# Patient Record
Sex: MALE | Race: WHITE | HISPANIC OR LATINO | URBAN - METROPOLITAN AREA
[De-identification: names, ages, dates, MRNs, and addresses within clinical notes are randomized per-mention and may not be internally consistent; named-entity substitution may affect disease eponyms.]

---

## 2020-03-14 ENCOUNTER — EMERGENCY (EMERGENCY)
Age: 60
LOS: 1 days | Discharge: HOME | End: 2020-03-14
Attending: EMERGENCY MEDICINE | Admitting: EMERGENCY MEDICINE
Payer: COMMERCIAL

## 2020-03-14 VITALS
HEART RATE: 88 BPM | OXYGEN SATURATION: 100 % | SYSTOLIC BLOOD PRESSURE: 162 MMHG | DIASTOLIC BLOOD PRESSURE: 87 MMHG | RESPIRATION RATE: 18 BRPM

## 2020-03-14 VITALS
RESPIRATION RATE: 18 BRPM | TEMPERATURE: 99 F | OXYGEN SATURATION: 100 % | DIASTOLIC BLOOD PRESSURE: 84 MMHG | SYSTOLIC BLOOD PRESSURE: 144 MMHG | HEART RATE: 90 BPM

## 2020-03-14 PROCEDURE — 71046 X-RAY EXAM CHEST 2 VIEWS: CPT | Mod: 26

## 2020-03-14 PROCEDURE — 99284 EMERGENCY DEPT VISIT MOD MDM: CPT

## 2020-03-14 RX ORDER — LIDOCAINE 4 G/100G
1 CREAM TOPICAL ONCE
Refills: 0 | Status: COMPLETED | OUTPATIENT
Start: 2020-03-14 | End: 2020-03-14

## 2020-03-14 RX ORDER — ACETAMINOPHEN 500 MG
975 TABLET ORAL ONCE
Refills: 0 | Status: COMPLETED | OUTPATIENT
Start: 2020-03-14 | End: 2020-03-14

## 2020-03-14 RX ORDER — KETOROLAC TROMETHAMINE 30 MG/ML
30 SYRINGE (ML) INJECTION ONCE
Refills: 0 | Status: DISCONTINUED | OUTPATIENT
Start: 2020-03-14 | End: 2020-03-14

## 2020-03-14 RX ADMIN — Medication 30 MILLIGRAM(S): at 22:09

## 2020-03-14 RX ADMIN — Medication 975 MILLIGRAM(S): at 22:09

## 2020-03-14 RX ADMIN — Medication 975 MILLIGRAM(S): at 21:30

## 2020-03-14 RX ADMIN — Medication 30 MILLIGRAM(S): at 21:30

## 2020-03-14 RX ADMIN — LIDOCAINE 1 PATCH: 4 CREAM TOPICAL at 21:30

## 2020-03-14 NOTE — ED PROVIDER NOTE - OBJECTIVE STATEMENT
59M hx of 1 cardiac stent placed 8 years ago on statin, b blocker, baby aspirin presenting with CC of mechanical fall from ladder while doing work at his house, states it was from about 4 feet, fell on to his back. Was in normal state of health prior to fall, no chest pain, dizziness, lightheadedness, recent illness. Lost his balance, while stepping down. No LOC. Now with back chest wall pain, worse with any movement and deep breaths. Not on any other blood thinners.

## 2020-03-14 NOTE — ED PROVIDER NOTE - ADDITIONAL NOTES AND INSTRUCTIONS:
Patient seen in the emergency department on 3/14/20. Recommended to stay home from work until the date mentioned above. Recommended to avoid any heavy lifting or physical exertion at work for the next 2 weeks (3/28/20).

## 2020-03-14 NOTE — ED PROVIDER NOTE - PATIENT PORTAL LINK FT
You can access the FollowMyHealth Patient Portal offered by [facility] by registering at the following website: [email or web address]. By joining [employer]’s FollowMyHealth portal, you will also be able to view your health information using other applications (apps) compatible with our system.

## 2020-03-14 NOTE — ED PROVIDER NOTE - NSFOLLOWUPINSTRUCTIONS_ED_ALL_ED_FT
To control your pain at home, you should take Ibuprofen 400 mg along with Tylenol 650mg-1000mg every 6 to 8 hours. Limit your maximum daily Tylenol from all sources to 4000mg. Be aware that many other medications contain acetaminophen which is also known as Tylenol. Taking Tylenol and Ibuprofen together has been shown to be more effective at relieving pain than taking them separately. These are both over the counter medications that you can  at your local pharmacy without a prescription. You need to respect all of the warnings on the bottles. You shouldn’t take these medications for more than a week without following up with your doctor. Both medications come with certain risks and side effects that you need to discuss with your doctor, especially if you are taking them for a prolonged period.    Buy Salonpas 4% lidocaine patch. Place over area of greatest pain.  Apply for 12 hours at a time.  Do not use more than 2 patches per day.    Please follow up with your PCP within the next 2-3 days.     Please return immediately for new or worsening cough, fevers, trouble breathing, new/worsening pain.

## 2020-03-14 NOTE — ED PROVIDER NOTE - CLINICAL SUMMARY MEDICAL DECISION MAKING FREE TEXT BOX
59M presenting s/p mechanical fall off of ladder with chest wall pain PE: VSS, +TTP posterior chest wall right side Ddx: Likely rib fractures, rule out pneumo/hemothorax or other lung injury Plan: CXR, pain control.

## 2020-03-14 NOTE — ED ADULT NURSE NOTE - NSIMPLEMENTINTERV_GEN_ALL_ED
Implemented All Fall with Harm Risk Interventions:  [place] to call system. Call bell, personal items and telephone within reach. Instruct patient to call for assistance. Room bathroom lighting operational. Non-slip footwear when patient is off stretcher. Physically safe environment: no spills, clutter or unnecessary equipment. Stretcher in lowest position, wheels locked, appropriate side rails in place. Provide visual cue, wrist band, yellow gown, etc. Monitor gait and stability. Monitor for mental status changes and reorient to person, place, and time. Review medications for side effects contributing to fall risk. Reinforce activity limits and safety measures with patient and family. Provide visual clues: red socks.

## 2020-03-14 NOTE — ED PROVIDER NOTE - NS ED ROS FT
CONST: no fevers, no chills, no trauma  EYES: no pain, no visual disturbances  ENT: no sore throat, no epistaxis, no rhinorrhea, no hearing changes  CV: no chest pain, no palpitations, no orthopnea, no extremity pain or swelling  RESP: no shortness of breath, no cough, no sputum, no pleurisy, no wheezing  ABD: no abdominal pain, no nausea, no vomiting, no diarrhea, no black or bloody stool  : no dysuria, no hematuria, no frequency, no urgency  MSK: + back pain, no neck pain, no extremity pain  NEURO: no headache, no sensory disturbances, no focal weakness, no dizziness  HEME: no easy bleeding or bruising  SKIN: no diaphoresis, no rash CONST: no fevers, no chills, no trauma  CV: no chest pain, no palpitations, no orthopnea, no extremity pain or swelling  RESP: no shortness of breath, no cough, no sputum, no pleurisy, no wheezing, +pain with deep inhalation   ABD: no abdominal pain, no nausea, no vomiting, no diarrhea, no black or bloody stool  : no dysuria, no hematuria, no frequency, no urgency  MSK: + back pain, no neck pain, no extremity pain  NEURO: no headache, no sensory disturbances, no focal weakness, no dizziness  HEME: no easy bleeding or bruising  SKIN: no diaphoresis, no rash

## 2020-03-14 NOTE — ED ADULT NURSE NOTE - OBJECTIVE STATEMENT
pt received in spot 17, A&OX3 c/o fall. Pt has mechanical fall off ladder today after doing work at his house. Ladder was ~4ft, fell onto back. Now c/o back pain, worse with movement and deep breaths. Denies any symptoms prior to falling, endorses that he lost his balance and missed a step. No obvious signs of deformity noted at this time. States he takes ASA daily. Resp even and unlabored. Will continue to monitor.

## 2020-03-14 NOTE — ED PROVIDER NOTE - ATTENDING CONTRIBUTION TO CARE
I performed a face to face bedside interview with patient regarding history of present illness, review of symptoms and past medical history. I completed an independent physical exam.  I have discussed patient's plan of care.   I agree with note as stated above, having amended the EMR as needed to reflect my findings. I have discussed the assessment and plan of care.  This includes during the time I functioned as the attending physician for this patient.  Attending Contribution to Care: agree with plan of resident. 59M hx of 1 cardiac stent placed 8 years ago on statin, b blocker, baby aspirin presenting with CC of mechanical fall from ladder while doing work at his house, states it was from about 4 feet, fell on to his back. Was in normal state of health prior to fall, no chest pain, dizziness, lightheadedness, recent illness. Lost his balance, while stepping down. No LOC. Now with back chest wall pain, worse with any movement and deep breaths. Not on any other blood thinners. pt d/laura with incentive spirometry, speaking full sentences, able to speak full sentences.

## 2020-03-14 NOTE — ED ADULT TRIAGE NOTE - CHIEF COMPLAINT QUOTE
pt. states he fell off a 6 foot ladder at about 5 pm, states he fell on his R side and R arm, able to move R arm w/out difficulty, reports R torso/rib pain, increased w/ inspiration. Abrasion noted to the R side of pt.'s head, denies LOC. Pt. takes baby aspirin daily. PMHx cardiac stent, HTN, HLD.